# Patient Record
(demographics unavailable — no encounter records)

---

## 2025-06-06 NOTE — HISTORY OF PRESENT ILLNESS
[de-identified] : 06/06/2025 HPI: SOUMYA TOVAR is a 65-year y/o F who presents for right hip pain x2-3 months into the groin. Patient notes symptoms worsen with WB activities and made better with rest. She also notes stiffness with certain activities, such as putting shoes/socks on. Patient has attempted conservative measures including Advil with no relief.  Right TKA at [facility] [place] with Dr. Edward, 2023. Patient continues to have stiffness in the right knee.    Patient presents today, 65 year F, for evaluation of their RT hip and RT knee Date of Injury/Onset: 2-3 months for right hip. around a year for RT knee Mechanism of injury: NKI This is not a Work-Related Injury being treated under Worker's Compensation. This is not an athletic injury occurring associated with an interscholastic or organized sports team.   Pain: At Rest: 4 /10  With Activity: 8 /10 Quality of symptoms: sharp pain in the right groin with movement. swelling and intermittent pain in the right knee.   Affecting Sleep: No Stiffness upon waking, lasting greater than 30mins: Yes Difficulty with stairs: Yes Difficulty getting in and out of car: Yes Difficulty applying shoe: No Sit to stand stiffness: Yes, sometimes Affects walking short/long distances? Yes Home/Work/Recreation affected? Yes   Improves with:  rest Worse with:  walking, sit to stand Have you been treated for this in the past? none for hip. Dr. Pace for knee Have you had surgery for this in the past? none for hip. RT knee replacement with Dr. Pace around 2023   OTC Medicines: Advil RX medicines:  none Heat, Ice, Elevation: heat   CSI or Gel Injections: None for hip Physical Therapy/ HEP: None for hip   Previous Treatment/Imaging/Studies Since Last Visit: none for hip

## 2025-06-06 NOTE — IMAGING
[de-identified] :  RIGHT Lower Extremity examined.   Inspection no gross deformity, skin intact, no erythema Palpation; patient with - tenderness to palpation in the groin, + tenderness over the greater troches ROM: 120 flexion, 35 IR, 50 ER, 50 Abduction, 35 Adduction + Stinchfield + FADIR, - ALENA - SLR 5/5 motor to lower extremity Sensation intact to light touch throughout all lower extremity dermatomes No numbness in lateral femoral cutaneous nerve 2+ distal pulses 1-2mm shorter RLE compared to LLE. Leg length discrepancy assessed at the medial mal   RIGHT Knee examined.   Patient is in no acute distress, alert and oriented   Inspection: Skin is intact - Effusion Incision site healing well - no signs of infection  Atrophy is not present Antalgic gait is not present   Palpation: - Medial joint line tenderness - Lateral joint line tenderness - Patellar tenderness - Pes anserine bursa - Popliteal fullness   Calf soft and nontender   Motion: Knee extension is 0 Knee flexion is 120 with stiffness    Strength: Quadriceps strength is 5/5 Hamstring strength is 5/5   Special Tests: Lachman is normal Posterior drawer is normal Varus stress stability is normal Valgus stress stability is normal   NV exam grossly intact distally. Sensation is grossly intact to light touch in the foot Motor function is 5/5 in the foot Capillary refill is less than 2 seconds in the toes Lymphadenopathy is not present Peripheral edema is not present   06/06/2025 AP, Lateral, Childress, Notch view of the RIGHT knee. Orthopedic hardware from previous total knee arthroplasty well fixed and well aligned. No signs of loosening. Patella central. No fractures noted.  AP Pelvis, AP Hip and Frog lateral of RIGHT hip. Joint line narrowing, subchondral sclerosis and osteophyte formation inferior and superior margin consistent with moderate to severe DJD. No fractures or dislcoation noted.

## 2025-06-26 NOTE — PLAN
[FreeTextEntry1] :  THIS VISIT WAS PART OF A NEWLY ESTABLISHED RELATIONSHIP DESIGNED TO ADDRESS THE MAJORITY OF THE PATIENT'S HEALTH CARE NEEDS WITH CONSISTENCY OVER A LONG PERIOD OF TIME.

## 2025-06-26 NOTE — REVIEW OF SYSTEMS
[Patient Intake Form Reviewed] : Patient intake form was reviewed [Incontinence] : incontinence [Joint Pain] : joint pain [Joint Stiffness] : joint stiffness [Negative] : Heme/Lymph

## 2025-06-26 NOTE — HISTORY OF PRESENT ILLNESS
[de-identified] :  SOUMYA TOVAR is a 65 year F who presents today to establish care with [facility] @ [place]. She is here today for an Annual Physical Exam. She reports that she has not been to a PCP in several years. She has been seeing subspecialists in the last few yrs. Progressive OA pain resulted in R knee replacement. She also has symptoms of pelvic organ prolapse, but been reluctant to undergo surgery. She reports awakening every night to urinate several times. During the Day-urinates every 2 hrs.  She has had pretty bad urinary leakage for the last several months.

## 2025-06-26 NOTE — PHYSICAL EXAM
[Kyphosis] : kyphosis [Grossly Normal Strength/Tone] : grossly normal strength/tone [No Focal Deficits] : no focal deficits [Normal Gait] : normal gait [Normal Sclera/Conjunctiva] : normal sclera/conjunctiva [Normal Outer Ear/Nose] : the outer ears and nose were normal in appearance [Normal Oropharynx] : the oropharynx was normal [Normal TMs] : both tympanic membranes were normal [No Lymphadenopathy] : no lymphadenopathy [No Abdominal Bruit] : a ~M bruit was not heard ~T in the abdomen [No Varicosities] : no varicosities [No Edema] : there was no peripheral edema [Normal] : soft, non-tender, non-distended, no masses palpated, no HSM and normal bowel sounds [Normal Supraclavicular Nodes] : no supraclavicular lymphadenopathy [Normal Anterior Cervical Nodes] : no anterior cervical lymphadenopathy [de-identified] : R knee healed incision

## 2025-06-26 NOTE — HEALTH RISK ASSESSMENT
[Never] : Never [# Of Children ___] : has [unfilled] children [No falls in past year] : Patient reported no falls in the past year [2] : 2) Feeling down, depressed, or hopeless for more than half of the days (2) [No] : No [Never (0 pts)] : Never (0 points) [] :  [Fully functional (bathing, dressing, toileting, transferring, walking, feeding)] : Fully functional (bathing, dressing, toileting, transferring, walking, feeding) [1] : 2) Feeling down, depressed, or hopeless for several days (1) [PHQ-2 Negative - No further assessment needed] : PHQ-2 Negative - No further assessment needed [Behavioral] : behavioral [Retired] : retired [Fully functional (using the telephone, shopping, preparing meals, housekeeping, doing laundry, using] : Fully functional and needs no help or supervision to perform IADLs (using the telephone, shopping, preparing meals, housekeeping, doing laundry, using transportation, managing medications and managing finances) [Designated Healthcare Proxy] : Designated healthcare proxy [Name: ___] : Health Care Proxy's Name: [unfilled]  [Relationship: ___] : Relationship: [unfilled] [I will adhere to the patient's wishes.] : I will adhere to the patient's wishes. [Audit-CScore] : 0 [de-identified] : no regular exercise [de-identified] : Dairy free diet - [[id number]] : 2 [Change in mental status noted] : No change in mental status noted [Language] : denies difficulty with language [Behavior] : denies difficulty with behavior [Learning/Retaining New Information] : denies difficulty learning/retaining new information [Handling Complex Tasks] : denies difficulty handling complex tasks [Reasoning] : denies difficulty with reasoning [Spatial Ability and Orientation] : denies difficulty with spatial ability and orientation [Reports changes in hearing] : Reports no changes in hearing [Reports changes in vision] : Reports no changes in vision [MammogramComments] : remote mammography-overdue  for  screening-RX given [ColonoscopyComments] : Never screened-Cologuard prescribed [AdvancecareDate] : 06/2025

## 2025-06-26 NOTE — PHYSICAL EXAM
[Kyphosis] : kyphosis [Grossly Normal Strength/Tone] : grossly normal strength/tone [No Focal Deficits] : no focal deficits [Normal Gait] : normal gait [Normal Sclera/Conjunctiva] : normal sclera/conjunctiva [Normal Outer Ear/Nose] : the outer ears and nose were normal in appearance [Normal Oropharynx] : the oropharynx was normal [Normal TMs] : both tympanic membranes were normal [No Lymphadenopathy] : no lymphadenopathy [No Abdominal Bruit] : a ~M bruit was not heard ~T in the abdomen [No Varicosities] : no varicosities [No Edema] : there was no peripheral edema [Normal] : soft, non-tender, non-distended, no masses palpated, no HSM and normal bowel sounds [Normal Supraclavicular Nodes] : no supraclavicular lymphadenopathy [Normal Anterior Cervical Nodes] : no anterior cervical lymphadenopathy [de-identified] : R knee healed incision

## 2025-06-26 NOTE — HISTORY OF PRESENT ILLNESS
[de-identified] :  SOUMYA TOVAR is a 65 year F who presents today to establish care with [facility] @ [place]. She is here today for an Annual Physical Exam. She reports that she has not been to a PCP in several years. She has been seeing subspecialists in the last few yrs. Progressive OA pain resulted in R knee replacement. She also has symptoms of pelvic organ prolapse, but been reluctant to undergo surgery. She reports awakening every night to urinate several times. During the Day-urinates every 2 hrs.  She has had pretty bad urinary leakage for the last several months.

## 2025-06-26 NOTE — HEALTH RISK ASSESSMENT
[Never] : Never [# Of Children ___] : has [unfilled] children [No falls in past year] : Patient reported no falls in the past year [2] : 2) Feeling down, depressed, or hopeless for more than half of the days (2) [No] : No [Never (0 pts)] : Never (0 points) [] :  [Fully functional (bathing, dressing, toileting, transferring, walking, feeding)] : Fully functional (bathing, dressing, toileting, transferring, walking, feeding) [1] : 2) Feeling down, depressed, or hopeless for several days (1) [PHQ-2 Negative - No further assessment needed] : PHQ-2 Negative - No further assessment needed [Behavioral] : behavioral [Retired] : retired [Fully functional (using the telephone, shopping, preparing meals, housekeeping, doing laundry, using] : Fully functional and needs no help or supervision to perform IADLs (using the telephone, shopping, preparing meals, housekeeping, doing laundry, using transportation, managing medications and managing finances) [Designated Healthcare Proxy] : Designated healthcare proxy [Name: ___] : Health Care Proxy's Name: [unfilled]  [Relationship: ___] : Relationship: [unfilled] [I will adhere to the patient's wishes.] : I will adhere to the patient's wishes. [Audit-CScore] : 0 [de-identified] : no regular exercise [de-identified] : Dairy free diet - [[id number]] : 2 [Change in mental status noted] : No change in mental status noted [Language] : denies difficulty with language [Behavior] : denies difficulty with behavior [Learning/Retaining New Information] : denies difficulty learning/retaining new information [Handling Complex Tasks] : denies difficulty handling complex tasks [Reasoning] : denies difficulty with reasoning [Spatial Ability and Orientation] : denies difficulty with spatial ability and orientation [Reports changes in hearing] : Reports no changes in hearing [Reports changes in vision] : Reports no changes in vision [MammogramComments] : remote mammography-overdue  for  screening-RX given [ColonoscopyComments] : Never screened-Cologuard prescribed [AdvancecareDate] : 06/2025